# Patient Record
(demographics unavailable — no encounter records)

---

## 2025-04-10 NOTE — ADDENDUM
[FreeTextEntry1] : Entered by Rafiq Weinberg, acting as scribe for Dr. Duke Aguillon. The documentation recorded by the scribe accurately reflects the service I personally performed and the decisions made by me.

## 2025-04-10 NOTE — LETTER BODY
[FreeTextEntry1] : PCP not listed.     Reason for Visit: BPH.       This is a 71 year-old gentleman with symptoms of BPH. Patient is here today for evaluation. Patient reports he has weak uroflow, frequency, and hesitancy. He denies any hematuria or urinary incontinence. His symptoms are aggravated by hydration. He denies any alleviating factors. He has not tried any medical therapy previously. He reports no pain. Patient has a high A1c.  All other review of systems are negative. He has no cancer in his family medical history. He has no previous surgical history. Past medical history, family history and social history were inquired and were noncontributory to current condition. The patient does not use tobacco or drink alcohol. Medications and allergies were reviewed. He has no known allergies to medication.       On examination, the patient is a healthy-appearing gentleman in no acute distress. He is alert and oriented follows commands. He has normal mood and affect. He is normocephalic. Neck is supple. Oral no thrush Respirations are unlabored. His abdomen is soft and nontender. Bladder is nonpalpable. No CVA tenderness. Neurologically he is grossly intact. No peripheral edema. Skin without gross abnormality.      Post-void residual on bladder scan today was 40 cc.   ASSESSMENT: BPH.       I counseled the patient on the various etiology of his symptoms. I discussed the natural history of BPH and the treatment options available. I discussed the options of conservative management with fluid in dietary restrictions, herbal therapy, medical therapy, and minimally invasive procedures. His PVR today was 40 cc. I recommended he try Flomax. I discussed the potential side effects of the medication. I counseled the patient on its use and side effects. If the patient develops any side effects, the patient will discontinue the medication and contact me. He will obtain PSA, BMP, and urinalysis to establish baseline. I also discussed that his high A1c is his most concerning issue. I discussed his uncontrolled glycosuria can lead to osmotic diuresis causing polyuria. I encouraged proper glycemic blood management and dietary restriction of carbohydrates.  He will also obtain A1c today to monitor glycemic control. Risks and alternatives were discussed. I answered the patient questions. The patient will follow-up as directed and will contact me with any questions or concerns. Thank you for the opportunity to participate in the care of Mr. JOSE. I will keep you updated on his progress.       Plan: Trial of Flomax. PSA. BMP. Urinalysis. A1c. PVR. Follow up in 1 month.

## 2025-04-10 NOTE — HISTORY OF PRESENT ILLNESS
[FreeTextEntry1] : New BPH.  PVR 40 cc.  Flomax.  PSA.  Follow-up 1.  Month  High A1c.  Improved glycemic control.   Please refer to URO Consult Note.